# Patient Record
Sex: FEMALE | URBAN - METROPOLITAN AREA
[De-identification: names, ages, dates, MRNs, and addresses within clinical notes are randomized per-mention and may not be internally consistent; named-entity substitution may affect disease eponyms.]

---

## 2020-05-15 ENCOUNTER — OFFICE VISIT (OUTPATIENT)
Dept: PRIMARY CARE CLINIC | Age: 40
End: 2020-05-15

## 2020-05-15 PROCEDURE — 99213 OFFICE O/P EST LOW 20 MIN: CPT | Performed by: NURSE PRACTITIONER

## 2020-05-15 NOTE — PROGRESS NOTES
FLU/COVID-19 CLINIC EVALUATION  HPI:  Symptom duration, days:  [] 1   [] 2    []3   [] 4    []5    []6   [] 7    []8    []9   [] 10    []11 []  12   [] 13    []14 or greater    There were no vitals filed for this visit. Review of Systems  Symptom course:  []Worsening        []Stable       [] Improving  []Fevers  Symptom (not measured)  Measured (Result: )  []Chills  []Cough  []Coughing up blood  []Chest Congestion  []Nasal Congestion  []Feeling short of breath  []Sometimes  []Frequently  []All the time  []Chest pain  []Headaches  []Tolerable  []Severe  []Sore throat  []Muscle aches  []Nausea  []Vomiting  []Unable to keep fluids down  []Diarrhea  []Severe  OTHER SYMPTOMS:      RISK FACTORS:  []Pregnant or possibly pregnant  []Age over 60  []Diabetes  []Heart disease  []Asthma  []COPD/Other chronic lung diseases  []Active Cancer  []On Chemotherapy  []Taking oral steroids  []History Lymphoma/Leukemia  [x]Close contact with a lab confirmed COVID-19 patient within 14 days of symptom onset  []History of travel from affected geographical areas within 14 days of symptom onset  []Health Care Worker Exposure no symptoms  []Health Care Worker Exposure symptomatic    Assessment :  [x]Alert      [x]Oriented to person/place/time  [x]No apparent distress      []Toxic appearing  [x]Breathing appears normal   []Appears tachypneic      [x]Speaking in full sentence    Physical Exam     Test ordered:    [x]COVID    _________  []Strep    ___________  []Flu       _________    Diagnosis:     []Flu  []Strep Throat  []Uncertain Viral Respiratory Illness  []Possible COVID-19  [x]Exposure COVID-19  []Other    PLAN:  []Referred to emergency department/ respiratory dept.   for evaluation      Discharge home with verbal and or written instructions for:  []Preventing the spread of Coronavirus   []Flu management  []Strep throat management  []Possible COVID-19 exposure with self-quarantine, isolation instructions and management of

## 2020-05-18 LAB
SARS-COV-2: NOT DETECTED
SOURCE: NORMAL